# Patient Record
Sex: FEMALE
[De-identification: names, ages, dates, MRNs, and addresses within clinical notes are randomized per-mention and may not be internally consistent; named-entity substitution may affect disease eponyms.]

---

## 2020-02-26 ENCOUNTER — APPOINTMENT (OUTPATIENT)
Dept: HEART AND VASCULAR | Facility: CLINIC | Age: 35
End: 2020-02-26
Payer: COMMERCIAL

## 2020-02-26 VITALS
HEART RATE: 70 BPM | WEIGHT: 120 LBS | DIASTOLIC BLOOD PRESSURE: 76 MMHG | RESPIRATION RATE: 12 BRPM | SYSTOLIC BLOOD PRESSURE: 110 MMHG | BODY MASS INDEX: 19.99 KG/M2 | HEIGHT: 65 IN

## 2020-02-26 DIAGNOSIS — Z78.9 OTHER SPECIFIED HEALTH STATUS: ICD-10-CM

## 2020-02-26 PROBLEM — Z00.00 ENCOUNTER FOR PREVENTIVE HEALTH EXAMINATION: Status: ACTIVE | Noted: 2020-02-26

## 2020-02-26 PROCEDURE — 99395 PREV VISIT EST AGE 18-39: CPT

## 2020-02-26 NOTE — HEALTH RISK ASSESSMENT
[Change in mental status noted] : No change in mental status noted [None] : None [With Significant Other] : lives with significant other [College] : College [Unemployed] : unemployed [] :  [Sexually Active] : sexually active [High Risk Behavior] : no high risk behavior [Feels Safe at Home] : Feels safe at home [Fully functional (bathing, dressing, toileting, transferring, walking, feeding)] : Fully functional (bathing, dressing, toileting, transferring, walking, feeding) [Fully functional (using the telephone, shopping, preparing meals, housekeeping, doing laundry, using] : Fully functional and needs no help or supervision to perform IADLs (using the telephone, shopping, preparing meals, housekeeping, doing laundry, using transportation, managing medications and managing finances) [Reports changes in hearing] : Reports no changes in hearing [Reports changes in vision] : Reports no changes in vision [Reports changes in dental health] : Reports no changes in dental health [Reports normal functional visual acuity (ie: able to read med bottle)] : Reports normal functional visual acuity [Carbon Monoxide Detector] : carbon monoxide detector [Smoke Detector] : smoke detector [Guns at Home] : no guns at home [Seat Belt] :  uses seat belt [Sunscreen] : uses sunscreen

## 2020-02-26 NOTE — HISTORY OF PRESENT ILLNESS
[de-identified] : 34-year-old female with no known past medical history comes in for an annual physical exam. Patient denies any chest pain shortness of breath PND orthopnea. Patient does complain of occasional proximal muscle pain that comes and goes. Patient was seen by rheumatology with no abnormality identified. Patient does believe perhaps that may be secondary to hormonal now that her cycle is back to normal there is some correlation between time of her cycle and symptoms.

## 2020-02-26 NOTE — PHYSICAL EXAM
[Well Nourished] : well nourished [No Acute Distress] : no acute distress [Well Developed] : well developed [Well-Appearing] : well-appearing [Normal Sclera/Conjunctiva] : normal sclera/conjunctiva [EOMI] : extraocular movements intact [PERRL] : pupils equal round and reactive to light [No JVD] : no jugular venous distention [Normal Oropharynx] : the oropharynx was normal [Normal Outer Ear/Nose] : the outer ears and nose were normal in appearance [No Lymphadenopathy] : no lymphadenopathy [Supple] : supple [Thyroid Normal, No Nodules] : the thyroid was normal and there were no nodules present [No Respiratory Distress] : no respiratory distress  [Normal Rate] : normal rate  [No Accessory Muscle Use] : no accessory muscle use [Clear to Auscultation] : lungs were clear to auscultation bilaterally [Regular Rhythm] : with a regular rhythm [Normal S1, S2] : normal S1 and S2 [No Murmur] : no murmur heard [No Carotid Bruits] : no carotid bruits [No Abdominal Bruit] : a ~M bruit was not heard ~T in the abdomen [No Varicosities] : no varicosities [No Edema] : there was no peripheral edema [Pedal Pulses Present] : the pedal pulses are present [No Palpable Aorta] : no palpable aorta [Soft] : abdomen soft [No Extremity Clubbing/Cyanosis] : no extremity clubbing/cyanosis [Non Tender] : non-tender [Non-distended] : non-distended [No Masses] : no abdominal mass palpated [No HSM] : no HSM [Normal Bowel Sounds] : normal bowel sounds [Normal Posterior Cervical Nodes] : no posterior cervical lymphadenopathy [No CVA Tenderness] : no CVA  tenderness [Normal Anterior Cervical Nodes] : no anterior cervical lymphadenopathy [No Joint Swelling] : no joint swelling [No Spinal Tenderness] : no spinal tenderness [Coordination Grossly Intact] : coordination grossly intact [No Rash] : no rash [Grossly Normal Strength/Tone] : grossly normal strength/tone [Normal Gait] : normal gait [No Focal Deficits] : no focal deficits [Deep Tendon Reflexes (DTR)] : deep tendon reflexes were 2+ and symmetric [Normal Insight/Judgement] : insight and judgment were intact [Normal Affect] : the affect was normal

## 2020-02-26 NOTE — PLAN
[FreeTextEntry1] : 1. Annual physical exam: Will obtain blood work and urine and advise once results known. For now patient is asymptomatic if symptoms recur will reconsider referral.

## 2020-02-27 LAB
25(OH)D3 SERPL-MCNC: 28.2 NG/ML
ALBUMIN SERPL ELPH-MCNC: 4.8 G/DL
ALP BLD-CCNC: 58 U/L
ALT SERPL-CCNC: 18 U/L
ANION GAP SERPL CALC-SCNC: 15 MMOL/L
APPEARANCE: ABNORMAL
AST SERPL-CCNC: 13 U/L
BACTERIA: NEGATIVE
BASOPHILS # BLD AUTO: 0.04 K/UL
BASOPHILS NFR BLD AUTO: 0.6 %
BILIRUB SERPL-MCNC: 0.3 MG/DL
BILIRUBIN URINE: NEGATIVE
BLOOD URINE: NEGATIVE
BUN SERPL-MCNC: 12 MG/DL
CALCIUM SERPL-MCNC: 9.8 MG/DL
CHLORIDE SERPL-SCNC: 102 MMOL/L
CHOLEST SERPL-MCNC: 157 MG/DL
CHOLEST/HDLC SERPL: 3.1 RATIO
CO2 SERPL-SCNC: 25 MMOL/L
COLOR: ABNORMAL
CREAT SERPL-MCNC: 0.64 MG/DL
EOSINOPHIL # BLD AUTO: 0.38 K/UL
EOSINOPHIL NFR BLD AUTO: 5.7 %
ESTIMATED AVERAGE GLUCOSE: 91 MG/DL
FOLATE SERPL-MCNC: 7.3 NG/ML
GLUCOSE QUALITATIVE U: NEGATIVE
GLUCOSE SERPL-MCNC: 79 MG/DL
HBA1C MFR BLD HPLC: 4.8 %
HCT VFR BLD CALC: 45.9 %
HDLC SERPL-MCNC: 51 MG/DL
HGB BLD-MCNC: 14.2 G/DL
HYALINE CASTS: 5 /LPF
IMM GRANULOCYTES NFR BLD AUTO: 0.2 %
KETONES URINE: NEGATIVE
LDLC SERPL CALC-MCNC: 88 MG/DL
LEUKOCYTE ESTERASE URINE: NEGATIVE
LYMPHOCYTES # BLD AUTO: 1.91 K/UL
LYMPHOCYTES NFR BLD AUTO: 28.8 %
MAGNESIUM SERPL-MCNC: 2.2 MG/DL
MAN DIFF?: NORMAL
MCHC RBC-ENTMCNC: 30.7 PG
MCHC RBC-ENTMCNC: 30.9 GM/DL
MCV RBC AUTO: 99.1 FL
MICROSCOPIC-UA: NORMAL
MONOCYTES # BLD AUTO: 0.43 K/UL
MONOCYTES NFR BLD AUTO: 6.5 %
NEUTROPHILS # BLD AUTO: 3.87 K/UL
NEUTROPHILS NFR BLD AUTO: 58.2 %
NITRITE URINE: NEGATIVE
PH URINE: 6
PHOSPHATE SERPL-MCNC: 3.3 MG/DL
PLATELET # BLD AUTO: 238 K/UL
POTASSIUM SERPL-SCNC: 5.2 MMOL/L
PROT SERPL-MCNC: 6.8 G/DL
PROTEIN URINE: NORMAL
RBC # BLD: 4.63 M/UL
RBC # FLD: 12.7 %
RED BLOOD CELLS URINE: 3 /HPF
SODIUM SERPL-SCNC: 142 MMOL/L
SPECIFIC GRAVITY URINE: 1.03
SQUAMOUS EPITHELIAL CELLS: 7 /HPF
T3FREE SERPL-MCNC: 3.59 PG/ML
T4 FREE SERPL-MCNC: 1.1 NG/DL
T4 SERPL-MCNC: 6.4 UG/DL
TRIGL SERPL-MCNC: 86 MG/DL
TSH SERPL-ACNC: 1.04 UIU/ML
UROBILINOGEN URINE: NORMAL
VIT B12 SERPL-MCNC: 413 PG/ML
WBC # FLD AUTO: 6.64 K/UL
WHITE BLOOD CELLS URINE: 2 /HPF

## 2021-03-05 ENCOUNTER — APPOINTMENT (OUTPATIENT)
Dept: HEART AND VASCULAR | Facility: CLINIC | Age: 36
End: 2021-03-05
Payer: COMMERCIAL

## 2021-03-05 VITALS
HEART RATE: 80 BPM | BODY MASS INDEX: 22.33 KG/M2 | RESPIRATION RATE: 12 BRPM | SYSTOLIC BLOOD PRESSURE: 132 MMHG | WEIGHT: 134 LBS | DIASTOLIC BLOOD PRESSURE: 78 MMHG | HEIGHT: 65 IN

## 2021-03-05 PROCEDURE — 99395 PREV VISIT EST AGE 18-39: CPT

## 2021-03-05 PROCEDURE — 99072 ADDL SUPL MATRL&STAF TM PHE: CPT

## 2021-03-05 NOTE — HISTORY OF PRESENT ILLNESS
[de-identified] : 35-year-old female with no known past medical history comes in for an annual physical exam. Overall, feels well denies chest pain shortness of breath PND orthopnea unintentional weight gain weight loss depression or anxiety.

## 2021-03-05 NOTE — PLAN
[FreeTextEntry1] : 1. Annual physical exam: Advised to continue diet and exercise. Will obtain blood work and urine. Patient does complain of some joint pain since childhood advised to see rheumatology.

## 2021-03-05 NOTE — HEALTH RISK ASSESSMENT
[Change in mental status noted] : No change in mental status noted [None] : None [With Significant Other] : lives with significant other [Unemployed] : unemployed [College] : College [] :  [Sexually Active] : sexually active [High Risk Behavior] : no high risk behavior [Feels Safe at Home] : Feels safe at home [Fully functional (bathing, dressing, toileting, transferring, walking, feeding)] : Fully functional (bathing, dressing, toileting, transferring, walking, feeding) [Fully functional (using the telephone, shopping, preparing meals, housekeeping, doing laundry, using] : Fully functional and needs no help or supervision to perform IADLs (using the telephone, shopping, preparing meals, housekeeping, doing laundry, using transportation, managing medications and managing finances) [Reports changes in hearing] : Reports no changes in hearing [Reports changes in vision] : Reports no changes in vision [Reports normal functional visual acuity (ie: able to read med bottle)] : Reports normal functional visual acuity [Reports changes in dental health] : Reports no changes in dental health

## 2021-03-08 LAB
25(OH)D3 SERPL-MCNC: 60.1 NG/ML
ALBUMIN SERPL ELPH-MCNC: 4.8 G/DL
ALP BLD-CCNC: 42 U/L
ALT SERPL-CCNC: 17 U/L
ANION GAP SERPL CALC-SCNC: 10 MMOL/L
APPEARANCE: CLEAR
AST SERPL-CCNC: 14 U/L
BACTERIA: NEGATIVE
BASOPHILS # BLD AUTO: 0.04 K/UL
BASOPHILS NFR BLD AUTO: 0.6 %
BILIRUB SERPL-MCNC: 0.4 MG/DL
BILIRUBIN URINE: NEGATIVE
BLOOD URINE: NEGATIVE
BUN SERPL-MCNC: 10 MG/DL
CALCIUM SERPL-MCNC: 10.1 MG/DL
CHLORIDE SERPL-SCNC: 104 MMOL/L
CHOLEST SERPL-MCNC: 150 MG/DL
CO2 SERPL-SCNC: 26 MMOL/L
COLOR: NORMAL
CREAT SERPL-MCNC: 0.73 MG/DL
EOSINOPHIL # BLD AUTO: 0.36 K/UL
EOSINOPHIL NFR BLD AUTO: 5.1 %
ESTIMATED AVERAGE GLUCOSE: 91 MG/DL
FOLATE SERPL-MCNC: 13.6 NG/ML
GLUCOSE QUALITATIVE U: NEGATIVE
GLUCOSE SERPL-MCNC: 90 MG/DL
HBA1C MFR BLD HPLC: 4.8 %
HCT VFR BLD CALC: 43.7 %
HDLC SERPL-MCNC: 54 MG/DL
HGB BLD-MCNC: 14.3 G/DL
HYALINE CASTS: 0 /LPF
IMM GRANULOCYTES NFR BLD AUTO: 0.9 %
KETONES URINE: NEGATIVE
LDLC SERPL CALC-MCNC: 88 MG/DL
LEUKOCYTE ESTERASE URINE: NEGATIVE
LYMPHOCYTES # BLD AUTO: 1.73 K/UL
LYMPHOCYTES NFR BLD AUTO: 24.7 %
MAGNESIUM SERPL-MCNC: 2.2 MG/DL
MAN DIFF?: NORMAL
MCHC RBC-ENTMCNC: 31.3 PG
MCHC RBC-ENTMCNC: 32.7 GM/DL
MCV RBC AUTO: 95.6 FL
MICROSCOPIC-UA: NORMAL
MONOCYTES # BLD AUTO: 0.45 K/UL
MONOCYTES NFR BLD AUTO: 6.4 %
NEUTROPHILS # BLD AUTO: 4.37 K/UL
NEUTROPHILS NFR BLD AUTO: 62.3 %
NITRITE URINE: NEGATIVE
NONHDLC SERPL-MCNC: 96 MG/DL
PH URINE: 5.5
PHOSPHATE SERPL-MCNC: 3.2 MG/DL
PLATELET # BLD AUTO: 237 K/UL
POTASSIUM SERPL-SCNC: 5 MMOL/L
PROT SERPL-MCNC: 6.8 G/DL
PROTEIN URINE: NEGATIVE
RBC # BLD: 4.57 M/UL
RBC # FLD: 12.6 %
RED BLOOD CELLS URINE: 1 /HPF
SARS-COV-2 IGG SERPL IA-ACNC: <0.1 INDEX
SARS-COV-2 IGG SERPL QL IA: NEGATIVE
SODIUM SERPL-SCNC: 140 MMOL/L
SPECIFIC GRAVITY URINE: 1.01
SQUAMOUS EPITHELIAL CELLS: 4 /HPF
T3FREE SERPL-MCNC: 3.17 PG/ML
T4 FREE SERPL-MCNC: 1 NG/DL
T4 SERPL-MCNC: 6.1 UG/DL
TRIGL SERPL-MCNC: 42 MG/DL
TSH SERPL-ACNC: 1.6 UIU/ML
UROBILINOGEN URINE: NORMAL
VIT B12 SERPL-MCNC: 379 PG/ML
WBC # FLD AUTO: 7.01 K/UL
WHITE BLOOD CELLS URINE: 2 /HPF

## 2021-09-03 ENCOUNTER — APPOINTMENT (OUTPATIENT)
Dept: HEART AND VASCULAR | Facility: CLINIC | Age: 36
End: 2021-09-03

## 2022-04-06 ENCOUNTER — APPOINTMENT (OUTPATIENT)
Dept: HEART AND VASCULAR | Facility: CLINIC | Age: 37
End: 2022-04-06
Payer: MEDICAID

## 2022-04-06 VITALS
HEART RATE: 70 BPM | BODY MASS INDEX: 23.66 KG/M2 | RESPIRATION RATE: 12 BRPM | DIASTOLIC BLOOD PRESSURE: 76 MMHG | HEIGHT: 65 IN | WEIGHT: 142 LBS | SYSTOLIC BLOOD PRESSURE: 104 MMHG

## 2022-04-06 PROCEDURE — 99395 PREV VISIT EST AGE 18-39: CPT

## 2022-04-06 NOTE — PLAN
[FreeTextEntry1] : 1. Annual physical exam: Continue diet and exercise will obtain blood work and urine advise with results unknown.

## 2022-04-06 NOTE — HISTORY OF PRESENT ILLNESS
[de-identified] : 36-year-old female with no significant past medical history comes in for annual physical exam. Overall, feels well denies chest pain shortness of breath PND or orthopnea. Does suffer from seasonal allergies.

## 2022-04-06 NOTE — HEALTH RISK ASSESSMENT
[Patient reported PAP Smear was normal] : Patient reported PAP Smear was normal [Change in mental status noted] : No change in mental status noted [None] : None [With Significant Other] : lives with significant other [Unemployed] : unemployed [College] : College [] :  [Sexually Active] : sexually active [High Risk Behavior] : no high risk behavior [Feels Safe at Home] : Feels safe at home [Fully functional (bathing, dressing, toileting, transferring, walking, feeding)] : Fully functional (bathing, dressing, toileting, transferring, walking, feeding) [Fully functional (using the telephone, shopping, preparing meals, housekeeping, doing laundry, using] : Fully functional and needs no help or supervision to perform IADLs (using the telephone, shopping, preparing meals, housekeeping, doing laundry, using transportation, managing medications and managing finances) [Reports changes in hearing] : Reports no changes in hearing [Reports changes in vision] : Reports no changes in vision [Reports normal functional visual acuity (ie: able to read med bottle)] : Reports normal functional visual acuity [Reports changes in dental health] : Reports no changes in dental health [PapSmearDate] : 09/21

## 2022-04-08 LAB
25(OH)D3 SERPL-MCNC: 27.1 NG/ML
ALBUMIN SERPL ELPH-MCNC: 4.9 G/DL
ALP BLD-CCNC: 57 U/L
ALT SERPL-CCNC: 27 U/L
ANION GAP SERPL CALC-SCNC: 13 MMOL/L
APPEARANCE: CLEAR
AST SERPL-CCNC: 15 U/L
BACTERIA: NEGATIVE
BASOPHILS # BLD AUTO: 0.04 K/UL
BASOPHILS NFR BLD AUTO: 0.5 %
BILIRUB SERPL-MCNC: 0.3 MG/DL
BILIRUBIN URINE: NEGATIVE
BLOOD URINE: NEGATIVE
BUN SERPL-MCNC: 11 MG/DL
CALCIUM SERPL-MCNC: 10.2 MG/DL
CHLORIDE SERPL-SCNC: 103 MMOL/L
CHOLEST SERPL-MCNC: 173 MG/DL
CO2 SERPL-SCNC: 23 MMOL/L
COLOR: NORMAL
COVID-19 NUCLEOCAPSID  GAM ANTIBODY INTERPRETATION: NEGATIVE
COVID-19 SPIKE DOMAIN ANTIBODY INTERPRETATION: POSITIVE
CREAT SERPL-MCNC: 0.68 MG/DL
EGFR: 116 ML/MIN/1.73M2
EOSINOPHIL # BLD AUTO: 0.29 K/UL
EOSINOPHIL NFR BLD AUTO: 4 %
ESTIMATED AVERAGE GLUCOSE: 97 MG/DL
FOLATE SERPL-MCNC: 10.4 NG/ML
GLUCOSE QUALITATIVE U: NEGATIVE
GLUCOSE SERPL-MCNC: 83 MG/DL
HBA1C MFR BLD HPLC: 5 %
HCT VFR BLD CALC: 44.7 %
HDLC SERPL-MCNC: 57 MG/DL
HGB BLD-MCNC: 14 G/DL
HYALINE CASTS: 1 /LPF
IMM GRANULOCYTES NFR BLD AUTO: 0.3 %
KETONES URINE: NEGATIVE
LDLC SERPL CALC-MCNC: 106 MG/DL
LEUKOCYTE ESTERASE URINE: NEGATIVE
LYMPHOCYTES # BLD AUTO: 2.05 K/UL
LYMPHOCYTES NFR BLD AUTO: 28.1 %
MAGNESIUM SERPL-MCNC: 2.3 MG/DL
MAN DIFF?: NORMAL
MCHC RBC-ENTMCNC: 30.4 PG
MCHC RBC-ENTMCNC: 31.3 GM/DL
MCV RBC AUTO: 97 FL
MICROSCOPIC-UA: NORMAL
MONOCYTES # BLD AUTO: 0.47 K/UL
MONOCYTES NFR BLD AUTO: 6.4 %
NEUTROPHILS # BLD AUTO: 4.43 K/UL
NEUTROPHILS NFR BLD AUTO: 60.7 %
NITRITE URINE: NEGATIVE
NONHDLC SERPL-MCNC: 116 MG/DL
PH URINE: 6.5
PHOSPHATE SERPL-MCNC: 3.1 MG/DL
PLATELET # BLD AUTO: 258 K/UL
POTASSIUM SERPL-SCNC: 4.9 MMOL/L
PROT SERPL-MCNC: 7.2 G/DL
PROTEIN URINE: NEGATIVE
RBC # BLD: 4.61 M/UL
RBC # FLD: 13.1 %
RED BLOOD CELLS URINE: 1 /HPF
SARS-COV-2 AB SERPL IA-ACNC: >250 U/ML
SARS-COV-2 AB SERPL QL IA: 0.06 INDEX
SODIUM SERPL-SCNC: 139 MMOL/L
SPECIFIC GRAVITY URINE: 1.01
SQUAMOUS EPITHELIAL CELLS: 1 /HPF
T3FREE SERPL-MCNC: 3.32 PG/ML
T4 FREE SERPL-MCNC: 1 NG/DL
T4 SERPL-MCNC: 7.4 UG/DL
TRIGL SERPL-MCNC: 50 MG/DL
TSH SERPL-ACNC: 1.41 UIU/ML
UROBILINOGEN URINE: NORMAL
VIT B12 SERPL-MCNC: 371 PG/ML
WBC # FLD AUTO: 7.3 K/UL
WHITE BLOOD CELLS URINE: 0 /HPF

## 2023-01-25 ENCOUNTER — APPOINTMENT (OUTPATIENT)
Dept: HEART AND VASCULAR | Facility: CLINIC | Age: 38
End: 2023-01-25
Payer: MEDICAID

## 2023-01-25 ENCOUNTER — NON-APPOINTMENT (OUTPATIENT)
Age: 38
End: 2023-01-25

## 2023-01-25 VITALS
SYSTOLIC BLOOD PRESSURE: 118 MMHG | DIASTOLIC BLOOD PRESSURE: 70 MMHG | HEIGHT: 65 IN | BODY MASS INDEX: 22.33 KG/M2 | WEIGHT: 134 LBS | RESPIRATION RATE: 12 BRPM | HEART RATE: 80 BPM

## 2023-01-25 PROCEDURE — 99214 OFFICE O/P EST MOD 30 MIN: CPT

## 2023-01-25 NOTE — HISTORY OF PRESENT ILLNESS
[FreeTextEntry8] : 37-year-old female with no significant past medical history comes in for evaluation of anxiety.  Patient is recently been going through issues at home.  Denies any physical abuse to her or any of her children.

## 2023-01-25 NOTE — PLAN
[FreeTextEntry1] : 1.  Anxiety: We will prescribe SSRI and Xanax.  Advised SSRI daily and Xanax as needed.  Discussed entering into therapy which patient agrees to do but not at this time.  Patient has a strong support system in place he is not suicidal or homicidal.

## 2023-01-26 LAB
ALBUMIN SERPL ELPH-MCNC: 5.1 G/DL
ALP BLD-CCNC: 50 U/L
ALT SERPL-CCNC: 13 U/L
ANION GAP SERPL CALC-SCNC: 11 MMOL/L
AST SERPL-CCNC: 12 U/L
BASOPHILS # BLD AUTO: 0.04 K/UL
BASOPHILS NFR BLD AUTO: 0.5 %
BILIRUB SERPL-MCNC: 0.4 MG/DL
BUN SERPL-MCNC: 11 MG/DL
CALCIUM SERPL-MCNC: 10.3 MG/DL
CHLORIDE SERPL-SCNC: 103 MMOL/L
CHOLEST SERPL-MCNC: 180 MG/DL
CO2 SERPL-SCNC: 25 MMOL/L
CREAT SERPL-MCNC: 0.78 MG/DL
EGFR: 100 ML/MIN/1.73M2
EOSINOPHIL # BLD AUTO: 0.02 K/UL
EOSINOPHIL NFR BLD AUTO: 0.2 %
ESTIMATED AVERAGE GLUCOSE: 94 MG/DL
FOLATE SERPL-MCNC: 10.9 NG/ML
GLUCOSE SERPL-MCNC: 104 MG/DL
HBA1C MFR BLD HPLC: 4.9 %
HCT VFR BLD CALC: 43.9 %
HDLC SERPL-MCNC: 60 MG/DL
HGB BLD-MCNC: 14 G/DL
IMM GRANULOCYTES NFR BLD AUTO: 0.2 %
LDLC SERPL CALC-MCNC: 109 MG/DL
LYMPHOCYTES # BLD AUTO: 1.56 K/UL
LYMPHOCYTES NFR BLD AUTO: 18.5 %
MAGNESIUM SERPL-MCNC: 2.3 MG/DL
MAN DIFF?: NORMAL
MCHC RBC-ENTMCNC: 30.8 PG
MCHC RBC-ENTMCNC: 31.9 GM/DL
MCV RBC AUTO: 96.5 FL
MONOCYTES # BLD AUTO: 0.37 K/UL
MONOCYTES NFR BLD AUTO: 4.4 %
NEUTROPHILS # BLD AUTO: 6.44 K/UL
NEUTROPHILS NFR BLD AUTO: 76.2 %
NONHDLC SERPL-MCNC: 119 MG/DL
PHOSPHATE SERPL-MCNC: 2.2 MG/DL
PLATELET # BLD AUTO: 267 K/UL
POTASSIUM SERPL-SCNC: 4.4 MMOL/L
PROT SERPL-MCNC: 7.2 G/DL
RBC # BLD: 4.55 M/UL
RBC # FLD: 12.8 %
SODIUM SERPL-SCNC: 139 MMOL/L
T3FREE SERPL-MCNC: 3.37 PG/ML
T4 FREE SERPL-MCNC: 1.2 NG/DL
T4 SERPL-MCNC: 9 UG/DL
TRIGL SERPL-MCNC: 52 MG/DL
TSH SERPL-ACNC: 1.16 UIU/ML
VIT B12 SERPL-MCNC: 388 PG/ML
WBC # FLD AUTO: 8.45 K/UL

## 2023-04-21 ENCOUNTER — APPOINTMENT (OUTPATIENT)
Dept: HEART AND VASCULAR | Facility: CLINIC | Age: 38
End: 2023-04-21
Payer: MEDICAID

## 2023-04-21 VITALS
HEART RATE: 70 BPM | HEIGHT: 65 IN | RESPIRATION RATE: 12 BRPM | SYSTOLIC BLOOD PRESSURE: 110 MMHG | WEIGHT: 130 LBS | BODY MASS INDEX: 21.66 KG/M2 | DIASTOLIC BLOOD PRESSURE: 70 MMHG

## 2023-04-21 DIAGNOSIS — F32.A ANXIETY DISORDER, UNSPECIFIED: ICD-10-CM

## 2023-04-21 DIAGNOSIS — F41.9 ANXIETY DISORDER, UNSPECIFIED: ICD-10-CM

## 2023-04-21 PROCEDURE — 99395 PREV VISIT EST AGE 18-39: CPT

## 2023-04-21 NOTE — PLAN
[FreeTextEntry1] : 1.  Annual physical exam: We will obtain blood work and urine and advise once results are known.\par 2.  Anxiety: Continue Zoloft and psychotherapy.

## 2023-04-21 NOTE — HEALTH RISK ASSESSMENT
[Patient reported PAP Smear was normal] : Patient reported PAP Smear was normal [None] : None [Alone] : lives alone [Unemployed] : unemployed [College] : College [] :  [Sexually Active] : not sexually active [High Risk Behavior] : no high risk behavior [Feels Safe at Home] : Feels safe at home [Fully functional (bathing, dressing, toileting, transferring, walking, feeding)] : Fully functional (bathing, dressing, toileting, transferring, walking, feeding) [Fully functional (using the telephone, shopping, preparing meals, housekeeping, doing laundry, using] : Fully functional and needs no help or supervision to perform IADLs (using the telephone, shopping, preparing meals, housekeeping, doing laundry, using transportation, managing medications and managing finances) [Reports changes in hearing] : Reports no changes in hearing [Reports changes in vision] : Reports no changes in vision [Reports normal functional visual acuity (ie: able to read med bottle)] : Reports normal functional visual acuity [Reports changes in dental health] : Reports no changes in dental health [PapSmearDate] : 1/23

## 2023-04-21 NOTE — HISTORY OF PRESENT ILLNESS
[de-identified] : 37-year-old female with no significant past medical history comes in for an annual physical exam.  Overall, feels well denies chest pain shortness of breath PND orthopnea.

## 2023-04-24 LAB
ALBUMIN SERPL ELPH-MCNC: 4.8 G/DL
ALP BLD-CCNC: 49 U/L
ALT SERPL-CCNC: 20 U/L
ANION GAP SERPL CALC-SCNC: 9 MMOL/L
APPEARANCE: ABNORMAL
AST SERPL-CCNC: 14 U/L
BACTERIA: NEGATIVE
BASOPHILS # BLD AUTO: 0.06 K/UL
BASOPHILS NFR BLD AUTO: 1 %
BILIRUB SERPL-MCNC: 0.3 MG/DL
BILIRUBIN URINE: NEGATIVE
BLOOD URINE: NEGATIVE
BUN SERPL-MCNC: 11 MG/DL
CALCIUM SERPL-MCNC: 9.8 MG/DL
CHLORIDE SERPL-SCNC: 105 MMOL/L
CHOLEST SERPL-MCNC: 168 MG/DL
CO2 SERPL-SCNC: 26 MMOL/L
COLOR: YELLOW
CREAT SERPL-MCNC: 0.74 MG/DL
EGFR: 107 ML/MIN/1.73M2
EOSINOPHIL # BLD AUTO: 0.12 K/UL
EOSINOPHIL NFR BLD AUTO: 2 %
ESTIMATED AVERAGE GLUCOSE: 94 MG/DL
FOLATE SERPL-MCNC: 6.1 NG/ML
GLUCOSE QUALITATIVE U: NEGATIVE
GLUCOSE SERPL-MCNC: 86 MG/DL
HBA1C MFR BLD HPLC: 4.9 %
HCT VFR BLD CALC: 44.1 %
HDLC SERPL-MCNC: 60 MG/DL
HGB BLD-MCNC: 13.8 G/DL
HYALINE CASTS: 3 /LPF
IMM GRANULOCYTES NFR BLD AUTO: 0.2 %
KETONES URINE: NEGATIVE
LDLC SERPL CALC-MCNC: 97 MG/DL
LEUKOCYTE ESTERASE URINE: NEGATIVE
LYMPHOCYTES # BLD AUTO: 1.56 K/UL
LYMPHOCYTES NFR BLD AUTO: 26.6 %
MAGNESIUM SERPL-MCNC: 2.1 MG/DL
MAN DIFF?: NORMAL
MCHC RBC-ENTMCNC: 31.1 PG
MCHC RBC-ENTMCNC: 31.3 GM/DL
MCV RBC AUTO: 99.3 FL
MICROSCOPIC-UA: NORMAL
MONOCYTES # BLD AUTO: 0.34 K/UL
MONOCYTES NFR BLD AUTO: 5.8 %
NEUTROPHILS # BLD AUTO: 3.78 K/UL
NEUTROPHILS NFR BLD AUTO: 64.4 %
NITRITE URINE: NEGATIVE
NONHDLC SERPL-MCNC: 108 MG/DL
PH URINE: 6
PHOSPHATE SERPL-MCNC: 3 MG/DL
PLATELET # BLD AUTO: 247 K/UL
POTASSIUM SERPL-SCNC: 4.5 MMOL/L
PROT SERPL-MCNC: 6.8 G/DL
PROTEIN URINE: ABNORMAL
RBC # BLD: 4.44 M/UL
RBC # FLD: 13.1 %
RED BLOOD CELLS URINE: 0 /HPF
SODIUM SERPL-SCNC: 140 MMOL/L
SPECIFIC GRAVITY URINE: 1.03
SQUAMOUS EPITHELIAL CELLS: 16 /HPF
T3FREE SERPL-MCNC: 3.05 PG/ML
T4 FREE SERPL-MCNC: 1.1 NG/DL
T4 SERPL-MCNC: 6.7 UG/DL
TRIGL SERPL-MCNC: 57 MG/DL
TSH SERPL-ACNC: 1.17 UIU/ML
URINE COMMENTS: NORMAL
UROBILINOGEN URINE: NORMAL
VIT B12 SERPL-MCNC: 315 PG/ML
WBC # FLD AUTO: 5.87 K/UL
WHITE BLOOD CELLS URINE: 1 /HPF

## 2023-08-02 ENCOUNTER — APPOINTMENT (OUTPATIENT)
Dept: HEART AND VASCULAR | Facility: CLINIC | Age: 38
End: 2023-08-02
Payer: MEDICAID

## 2023-08-02 VITALS
RESPIRATION RATE: 12 BRPM | BODY MASS INDEX: 21.66 KG/M2 | HEIGHT: 65 IN | DIASTOLIC BLOOD PRESSURE: 70 MMHG | WEIGHT: 130 LBS | HEART RATE: 60 BPM | SYSTOLIC BLOOD PRESSURE: 102 MMHG

## 2023-08-02 DIAGNOSIS — J34.89 OTHER SPECIFIED DISORDERS OF NOSE AND NASAL SINUSES: ICD-10-CM

## 2023-08-02 PROCEDURE — 99213 OFFICE O/P EST LOW 20 MIN: CPT

## 2023-08-02 NOTE — PLAN
[FreeTextEntry1] : 1.  Sinus pressure: Likely viral; will prescribe Flonase as needed and advised to follow-up with urgent care for PCR done yesterday.  Advised to call or return if symptoms worsen.

## 2023-08-02 NOTE — HISTORY OF PRESENT ILLNESS
[FreeTextEntry8] : 38-year-old female with no significant past medical history comes in for evaluation of sinus pressure.  Patient reports a few days of pressure at the frontal and maxillary sinuses that comes and goes no fever or nasal discharge.  Patient did go to urgent care underwent respiratory viral panel results of which are pending.  Patient does report hot flashes as well.

## 2023-08-31 ENCOUNTER — RX RENEWAL (OUTPATIENT)
Age: 38
End: 2023-08-31

## 2023-11-27 ENCOUNTER — RX RENEWAL (OUTPATIENT)
Age: 38
End: 2023-11-27

## 2024-05-09 ENCOUNTER — APPOINTMENT (OUTPATIENT)
Dept: HEART AND VASCULAR | Facility: CLINIC | Age: 39
End: 2024-05-09
Payer: MEDICAID

## 2024-05-09 ENCOUNTER — NON-APPOINTMENT (OUTPATIENT)
Age: 39
End: 2024-05-09

## 2024-05-09 VITALS
HEART RATE: 64 BPM | RESPIRATION RATE: 12 BRPM | WEIGHT: 135 LBS | DIASTOLIC BLOOD PRESSURE: 70 MMHG | BODY MASS INDEX: 22.49 KG/M2 | HEIGHT: 65 IN | SYSTOLIC BLOOD PRESSURE: 112 MMHG

## 2024-05-09 DIAGNOSIS — Z00.00 ENCOUNTER FOR GENERAL ADULT MEDICAL EXAMINATION W/OUT ABNORMAL FINDINGS: ICD-10-CM

## 2024-05-09 PROCEDURE — 99395 PREV VISIT EST AGE 18-39: CPT

## 2024-05-09 RX ORDER — FLUTICASONE PROPIONATE 50 UG/1
50 SPRAY, METERED NASAL
Qty: 1 | Refills: 0 | Status: COMPLETED | COMMUNITY
Start: 2023-08-02 | End: 2024-05-09

## 2024-05-09 RX ORDER — FLUTICASONE PROPIONATE 50 UG/1
50 SPRAY, METERED NASAL
Qty: 16 | Refills: 1 | Status: COMPLETED | COMMUNITY
Start: 2023-08-31 | End: 2024-05-09

## 2024-05-09 RX ORDER — SERTRALINE 25 MG/1
25 TABLET, FILM COATED ORAL DAILY
Qty: 30 | Refills: 5 | Status: ACTIVE | COMMUNITY
Start: 2023-01-25 | End: 1900-01-01

## 2024-05-09 RX ORDER — ALPRAZOLAM 0.25 MG/1
0.25 TABLET ORAL
Qty: 60 | Refills: 0 | Status: ACTIVE | COMMUNITY
Start: 2023-01-25 | End: 1900-01-01

## 2024-05-09 NOTE — PLAN
[FreeTextEntry1] : 1.  Annual physical exam: Advised continue diet and exercise will obtain blood work as patient currently on her period  Will hold off on urine.

## 2024-05-09 NOTE — HEALTH RISK ASSESSMENT
[Patient reported PAP Smear was normal] : Patient reported PAP Smear was normal [Change in mental status noted] : No change in mental status noted [None] : None [With Family] : lives with family [Employed] : employed [College] : College [] :  [Sexually Active] : not sexually active [High Risk Behavior] : no high risk behavior [Feels Safe at Home] : Feels safe at home [Fully functional (bathing, dressing, toileting, transferring, walking, feeding)] : Fully functional (bathing, dressing, toileting, transferring, walking, feeding) [Fully functional (using the telephone, shopping, preparing meals, housekeeping, doing laundry, using] : Fully functional and needs no help or supervision to perform IADLs (using the telephone, shopping, preparing meals, housekeeping, doing laundry, using transportation, managing medications and managing finances) [Reports changes in hearing] : Reports no changes in hearing [Reports changes in vision] : Reports no changes in vision [Reports normal functional visual acuity (ie: able to read med bottle)] : Reports normal functional visual acuity [Reports changes in dental health] : Reports no changes in dental health [PapSmearDate] : 3/24

## 2024-05-09 NOTE — HISTORY OF PRESENT ILLNESS
[de-identified] : 38-year-old female comes in for an annual physical exam.  Overall, feels well denies chest pain shortness of breath PND orthopnea.

## 2024-05-10 LAB
25(OH)D3 SERPL-MCNC: 27.7 NG/ML
ALBUMIN SERPL ELPH-MCNC: 4.9 G/DL
ALP BLD-CCNC: 43 U/L
ALT SERPL-CCNC: 22 U/L
ANION GAP SERPL CALC-SCNC: 13 MMOL/L
AST SERPL-CCNC: 16 U/L
BILIRUB SERPL-MCNC: 0.4 MG/DL
BUN SERPL-MCNC: 18 MG/DL
CALCIUM SERPL-MCNC: 9.5 MG/DL
CHLORIDE SERPL-SCNC: 106 MMOL/L
CHOLEST SERPL-MCNC: 167 MG/DL
CO2 SERPL-SCNC: 22 MMOL/L
CREAT SERPL-MCNC: 0.75 MG/DL
EGFR: 104 ML/MIN/1.73M2
ESTIMATED AVERAGE GLUCOSE: 88 MG/DL
FOLATE SERPL-MCNC: 9.2 NG/ML
GLUCOSE SERPL-MCNC: 75 MG/DL
HBA1C MFR BLD HPLC: 4.7 %
HCT VFR BLD CALC: 41.2 %
HDLC SERPL-MCNC: 60 MG/DL
HGB BLD-MCNC: 13.7 G/DL
LDLC SERPL CALC-MCNC: 96 MG/DL
MAGNESIUM SERPL-MCNC: 2.2 MG/DL
MCHC RBC-ENTMCNC: 31.3 PG
MCHC RBC-ENTMCNC: 33.3 GM/DL
MCV RBC AUTO: 94.1 FL
NONHDLC SERPL-MCNC: 107 MG/DL
PHOSPHATE SERPL-MCNC: 3.5 MG/DL
PLATELET # BLD AUTO: 214 K/UL
POTASSIUM SERPL-SCNC: 4.6 MMOL/L
PROT SERPL-MCNC: 7 G/DL
RBC # BLD: 4.38 M/UL
RBC # FLD: 13 %
SODIUM SERPL-SCNC: 140 MMOL/L
T3FREE SERPL-MCNC: 2.71 PG/ML
T4 FREE SERPL-MCNC: 1 NG/DL
T4 SERPL-MCNC: 6.6 UG/DL
TRIGL SERPL-MCNC: 56 MG/DL
TSH SERPL-ACNC: 1.39 UIU/ML
VIT B12 SERPL-MCNC: 344 PG/ML
WBC # FLD AUTO: 9.47 K/UL

## 2024-07-01 ENCOUNTER — APPOINTMENT (OUTPATIENT)
Dept: HEART AND VASCULAR | Facility: CLINIC | Age: 39
End: 2024-07-01

## 2024-08-20 ENCOUNTER — APPOINTMENT (OUTPATIENT)
Dept: HEART AND VASCULAR | Facility: CLINIC | Age: 39
End: 2024-08-20

## 2025-04-07 ENCOUNTER — RX RENEWAL (OUTPATIENT)
Age: 40
End: 2025-04-07

## 2025-05-06 ENCOUNTER — RX RENEWAL (OUTPATIENT)
Age: 40
End: 2025-05-06

## 2025-05-14 ENCOUNTER — APPOINTMENT (OUTPATIENT)
Dept: HEART AND VASCULAR | Facility: CLINIC | Age: 40
End: 2025-05-14
Payer: MEDICAID

## 2025-05-14 VITALS
BODY MASS INDEX: 21.99 KG/M2 | HEIGHT: 65 IN | SYSTOLIC BLOOD PRESSURE: 110 MMHG | WEIGHT: 132 LBS | RESPIRATION RATE: 12 BRPM | HEART RATE: 62 BPM | DIASTOLIC BLOOD PRESSURE: 78 MMHG

## 2025-05-14 DIAGNOSIS — Z00.00 ENCOUNTER FOR GENERAL ADULT MEDICAL EXAMINATION W/OUT ABNORMAL FINDINGS: ICD-10-CM

## 2025-05-14 PROCEDURE — 99395 PREV VISIT EST AGE 18-39: CPT

## 2025-05-15 LAB
25(OH)D3 SERPL-MCNC: 27.9 NG/ML
ALBUMIN SERPL ELPH-MCNC: 4.8 G/DL
ALP BLD-CCNC: 53 U/L
ALT SERPL-CCNC: 18 U/L
ANION GAP SERPL CALC-SCNC: 14 MMOL/L
AST SERPL-CCNC: 19 U/L
BILIRUB SERPL-MCNC: 0.2 MG/DL
BUN SERPL-MCNC: 10 MG/DL
CALCIUM SERPL-MCNC: 9.7 MG/DL
CHLORIDE SERPL-SCNC: 103 MMOL/L
CHOLEST SERPL-MCNC: 148 MG/DL
CO2 SERPL-SCNC: 23 MMOL/L
CREAT SERPL-MCNC: 0.7 MG/DL
EGFRCR SERPLBLD CKD-EPI 2021: 112 ML/MIN/1.73M2
FOLATE SERPL-MCNC: 11.2 NG/ML
GLUCOSE SERPL-MCNC: 84 MG/DL
HCT VFR BLD CALC: 43.9 %
HDLC SERPL-MCNC: 50 MG/DL
HGB BLD-MCNC: 13.8 G/DL
LDLC SERPL-MCNC: 82 MG/DL
MAGNESIUM SERPL-MCNC: 2.3 MG/DL
MCHC RBC-ENTMCNC: 30.6 PG
MCHC RBC-ENTMCNC: 31.4 G/DL
MCV RBC AUTO: 97.3 FL
NONHDLC SERPL-MCNC: 98 MG/DL
PHOSPHATE SERPL-MCNC: 3.1 MG/DL
PLATELET # BLD AUTO: 245 K/UL
POTASSIUM SERPL-SCNC: 5.1 MMOL/L
PROT SERPL-MCNC: 7.2 G/DL
RBC # BLD: 4.51 M/UL
RBC # FLD: 13 %
SODIUM SERPL-SCNC: 140 MMOL/L
T3FREE SERPL-MCNC: 3.3 PG/ML
T4 FREE SERPL-MCNC: 1 NG/DL
T4 SERPL-MCNC: 8.4 UG/DL
TRIGL SERPL-MCNC: 80 MG/DL
TSH SERPL-ACNC: 2.07 UIU/ML
VIT B12 SERPL-MCNC: 335 PG/ML
WBC # FLD AUTO: 6.74 K/UL

## 2025-05-16 LAB
APPEARANCE: CLEAR
BACTERIA: NEGATIVE /HPF
BILIRUBIN URINE: NEGATIVE
BLOOD URINE: NEGATIVE
CAST: 0 /LPF
COLOR: YELLOW
EPITHELIAL CELLS: 2 /HPF
ESTIMATED AVERAGE GLUCOSE: 100 MG/DL
GLUCOSE QUALITATIVE U: NEGATIVE MG/DL
HBA1C MFR BLD HPLC: 5.1 %
KETONES URINE: NEGATIVE MG/DL
LEUKOCYTE ESTERASE URINE: ABNORMAL
MICROSCOPIC-UA: NORMAL
NITRITE URINE: NEGATIVE
PH URINE: 6
PROTEIN URINE: NEGATIVE MG/DL
RED BLOOD CELLS URINE: 1 /HPF
SPECIFIC GRAVITY URINE: 1.01
UROBILINOGEN URINE: 0.2 MG/DL
WHITE BLOOD CELLS URINE: 1 /HPF